# Patient Record
Sex: FEMALE | Race: ASIAN | NOT HISPANIC OR LATINO | Employment: FULL TIME | URBAN - METROPOLITAN AREA
[De-identification: names, ages, dates, MRNs, and addresses within clinical notes are randomized per-mention and may not be internally consistent; named-entity substitution may affect disease eponyms.]

---

## 2017-09-24 ENCOUNTER — OFFICE VISIT (OUTPATIENT)
Dept: URGENT CARE | Facility: CLINIC | Age: 52
End: 2017-09-24
Payer: COMMERCIAL

## 2017-09-24 DIAGNOSIS — M25.512 PAIN IN LEFT SHOULDER: ICD-10-CM

## 2017-09-24 PROCEDURE — 99213 OFFICE O/P EST LOW 20 MIN: CPT

## 2018-11-28 ENCOUNTER — HOSPITAL ENCOUNTER (EMERGENCY)
Facility: HOSPITAL | Age: 53
Discharge: LEFT BEFORE DISCHARGE | End: 2018-11-28
Attending: EMERGENCY MEDICINE
Payer: COMMERCIAL

## 2018-11-28 ENCOUNTER — OFFICE VISIT (OUTPATIENT)
Dept: URGENT CARE | Facility: CLINIC | Age: 53
End: 2018-11-28
Payer: COMMERCIAL

## 2018-11-28 VITALS
WEIGHT: 154 LBS | BODY MASS INDEX: 25.66 KG/M2 | RESPIRATION RATE: 16 BRPM | HEIGHT: 65 IN | HEART RATE: 107 BPM | SYSTOLIC BLOOD PRESSURE: 200 MMHG | TEMPERATURE: 100.2 F | DIASTOLIC BLOOD PRESSURE: 98 MMHG

## 2018-11-28 VITALS
SYSTOLIC BLOOD PRESSURE: 223 MMHG | DIASTOLIC BLOOD PRESSURE: 125 MMHG | HEART RATE: 115 BPM | OXYGEN SATURATION: 98 % | RESPIRATION RATE: 18 BRPM | TEMPERATURE: 100.1 F

## 2018-11-28 DIAGNOSIS — J06.9 ACUTE UPPER RESPIRATORY INFECTION: ICD-10-CM

## 2018-11-28 DIAGNOSIS — I10 HYPERTENSION: Primary | ICD-10-CM

## 2018-11-28 DIAGNOSIS — R68.89 FLU-LIKE SYMPTOMS: ICD-10-CM

## 2018-11-28 DIAGNOSIS — R03.0 ELEVATED BLOOD PRESSURE READING: Primary | ICD-10-CM

## 2018-11-28 PROCEDURE — 99283 EMERGENCY DEPT VISIT LOW MDM: CPT

## 2018-11-28 PROCEDURE — 99213 OFFICE O/P EST LOW 20 MIN: CPT | Performed by: PHYSICIAN ASSISTANT

## 2018-11-28 PROCEDURE — S9088 SERVICES PROVIDED IN URGENT: HCPCS | Performed by: PHYSICIAN ASSISTANT

## 2018-11-28 RX ORDER — BENZONATATE 100 MG/1
100 CAPSULE ORAL ONCE
Status: DISCONTINUED | OUTPATIENT
Start: 2018-11-28 | End: 2018-11-28 | Stop reason: HOSPADM

## 2018-11-28 RX ORDER — GUAIFENESIN/DEXTROMETHORPHAN 100-10MG/5
5 SYRUP ORAL 3 TIMES DAILY PRN
COMMUNITY

## 2018-11-29 NOTE — DISCHARGE INSTRUCTIONS
?? ??? ??   ?? ??:   ?? ??? ??  ? ?? ????? ???  ?, ???, ? ? ??? ??? ? ? ????    ???? ??? ??? ??? ????   ????? ?? ??? ?? 3~5?? ?? ????  ?? ??? ?? ??? ??? ? ????   · ?? ?? ???    · ??? ? ??    · ??? ?? ?? ??    · ????, ??? ??, ?? ?    · ???     · ?? ? ??    · ??, ?? ?? ???  ?? ?? ?? ??? ?????    · ?? ?? ?? ?? ??? ????     ??? ?? ??, ????? ??????    · ??? 39°C(102ºF)?  ?????     · ??? ??? ????? ???? ?? ?? ??? ??? ????     · 3~5? ??? ??? ????? 14?? ??? ??? ???? ????     · ??? ??? ????     · ?? ?? ?? ?? ????     · ???, ?? ?? ???? ??? ????     · ?? ???, ?? ?? ?? ?? ??? ???? ????     · 24?? ?? ??? ?? ??? ?? ? ????     · ?? ??? ????     · ?? ??, ?? ??? ??? ??? ??? ??? ????   ??? ?? ??:  ???? ???? ????  ??? ????? ?? ???? ???? ??? ? ????  ???? ???? 7~14??? ?????  2~3?? ??? ??? ?? ????  ??? ??? ??? ???? ? ????   · ??????  ? ???? ???? ?? ??? ????? ?????  ?????? ??? ???? ??? ??? ??? ???? ??? ? ????  ? ? ?? ?????? ????? ???? ????     · ?? ???  ? ??? ?????  ??? ?? ??? ???? ?? ???? ????? ?? ????      · ?????? ?? NSAID? ??, ?? ? ?? ??????  NSAID? ?????? ? ?? ?? ?? ??? ??? ? ????  ?? ?? ???? ???? ??? ??, ?? NSAID? ??? ???? ????? ??????  ?? ?? ??? ?? ??? ?????      · ???????  ? ??? ??? ??????  ??? ?? ??? ?? ?????  ???? ?? ??? ?? ??????  ?? ??? ?????  ???? ??? ?? ??? ??? ???? ???????? ???? ??? ?????? ??? ?? ???? ??????  ???????? ??? ???? ???? ?? ??? ?? ??? ? ? ????  ??? ????? ???????? ? 4??(4,000????)? ???? ??? ????   ?? ??:   · ??? ??? ?? ?????   ???? ??? ?? ?????      · ??? ?? ??? ? ?? ??????   ??? ??? ??? ????? ???? ??? ? ????  ??? ??? ?? ?? ??? ? ? ????  ??? ???? ???? ?, ?? ?? ? ?? ????  ???? ??? ??? ???? ????  ???? ?? ??? ?? ? ????  ??? ??? ?? ??? ???? ??? ????? ??????      · ???? ??????   ??? ???? ???????  ??? ?? ???? ?????  1/4???? ??? ??? ? ? ?? ??? ???? ????  ??? ???? ????? ?? ?? ?? ????  ??? ?? ????? ??? ?? ????     · ??? ?? ??????? ?????   ????? ??????? ???? ??? ??? ??????  ??? ???? ??? ??? ? ??? ? ? ????      · ??? ?? ??? ?? ???? ??????   ??? ?? ??? ??? ??? ???      · ??? ?? ???? ???? ?????   ??? ?? ?? ? ? ??? ???? ? ????      · ??? ??? ????   ?? ? ???? ?? ??? ? ?? ?? ??? ??? ???? ? ????  ??, ???? ?? ??? ?? ??? ??? ? ????  ?? ???? ??? ??? ??? ??? ?? ????? ??? ??? ??????  ?? ?? ?? ?? ???? ???? ???? ????  ??? ??? ???? ?? ????? ??????   ??? ?? ???? ????? ????    · ??? ?? ???? ?? 2~3???? ?? ???? ??? ?????      · ???? ??? ?? ??? ??? ?? ????      · ???? ???? ?? ???? ????     · ?? ??, ?? ?? ? ?? ?????  ?? ??? ??? ?? ??? ?? ?????? ??? ??? ?? ?? ?????        ??? ?? ??? ?? ??? ??????   ??? ?? ??, ?? ?? ???? ????? ? ?? ???? ??????    © 2017 [address] Information is for End User's use only and may not be sold, redistributed or otherwise used for commercial purposes  All illustrations and images included in CareNotes® are the copyrighted property of A [employer] M [employer]  or [employer]  The above information is an  only  It is not intended as medical advice for individual conditions or treatments  Talk to your doctor, nurse or pharmacist before following any medical regimen to see if it is safe and effective for you        ?? ???   ?? ??:   ???  ? ?? ??(BP)???  ??? ??? ????? ??? ?? ???? ????  ?? BP? 120/80 ?????  ?????? 120/80? 139/89 ?????  ???? 140/90 ?????  ???? BP? ?? ??? ?? ??? ???? ?? ?? ??? ????  ? ?? ??? ??? ? ????  ?? ???? ??? ?? ???? ??? ??? ? ?? ??? ?????  ??? ???? ??, ?, ? ? ?? ?? ?? ??? ??? ? ????    ???? ???? ??? ?????    · ??     · ??? ??    · ?? ??     · ???? ?? ???     · ?? ??     · ??  ?? ? ??? ??? ???? 911??? ??????    · ??? ???? ??, ???, ??? ?? ??? ?? ???? ????      · ???? ????? ??? ??????     · ??? ???? ???? ?? ??? ????     · ?, ?, ?, ?? ?? ?? ?? ?? ???? ????   ?? ?? ?? ??? ?????    · ??? ??? ??? ?? ??? ?????     · ??? ??? ?? ????   ??? ?? ??, ????? ??????    · ???, ?????, ??? ?? ?? ??? ???      · ???? ?? ??? ???? ???? ??? ??? ???? ??? ???? ??? ????     · ?? ??, ?? ??? ??? ??? ??? ??? ?? ??   ?? ???? ??  ?? BP? ????? ??? ??? ??? ??? ? ????  ????? ??? ??? ?? ??? ??? ? ??? ??? ?? ?? ??? ? ????  ?? ??? ??? ?? ???? ??? ?? ? ????  ??, ?? ??? ??? ? ?? ????  ??? ?? ???? ??? ??????   ?? ??? ??:  ???? ?? ??? ???? ???? ?? ??? ?? ???? ??????   · ???? ??? ??????   ??? ???? ?? 5?? ??? ?? ????  ?? ???? ???? ?? ?????  ?? ??? ?? ???? ???  ?? ???? ???? ?? ??? ?????  ???? ??? ??? ?? 2?? ?? ???? ?????  ?? ?? ? ??? ?? ??? ??? ?? ?? 2?? ??? ??????  ?? ???? ??? ???? ?? ?? ??? ??? ????  ??? ?? ??? ??? ?? ???? ????? ??????            · ??? ?? ???(??) ???? ??????   ??? ??? ??? ?? ??? ??? ? ? ????  ?? ??? ????? ???? ?? ?? ??? ?????  ?? ?? ??? ?? ?? ?? ??? ?????  ??? ???? ?? ??? ??? ? ????  ???? ??? ??? ??? ??? ? ?? ?? ?? ???? ????  ???? ???? ??? 2,300 mg?? ????? ??? ? ????            · ???? ???? ?? ??? ?????   ??? ?? ???? ??? ?? ?? DASH(Dietary Approaches to Stop Hypertension) ? ? ??? ?? ??? ??? ??? ? ????  DASH ??? ???, ?? ?? ? ???? ?? ??? ????  ??, ?? ? ???? ???? ??? ????            · ??? ?? ??? ????? ??? ??????   ?? ???? ??? ?? ??? 30?? ??????  ??? ?? ?? ??? ??? ???  ???? ?? ??? ??? ?? ??????      · ????? ?????   ??? ??? ?? ? ??? ?????  ?? ???? ?? ?? ??? ?? ??? ?? ??? ??? ?????     · ??? ?????   ??? ?? ? ??? ??? ???? ???  ??? ?? ? ??? ??? ???? ???  ??? ? ?? ?? 12??, ?? 5??, ??? 1½?????     · ??? ??? ????   ?? ? ???? ?? ??? ? ?? ????? BP? ????? ? ??? ??? ? ????  ?? ???? ??? ??? ??? ??? ?? ????? ??? ??? ??????  ?? ?? ?? ?? ???? ???? ???? ????  ??? ??? ???? ?? ????? ??????   ??? ?? ??? ??????   ????? ??? ???? ?? ?? ??? ???? ???  ??? ?? ??, ?? ?? ???? ????? ? ?? ???? ??????    © 2017 [address]  Information is for End User's use only and may not be sold, redistributed or otherwise used for commercial purposes  All illustrations and images included in CareNotes® are the copyrighted property of [employer]  or [employer]  The above information is an  only   It is not intended as medical advice for individual conditions or treatments  Talk to your doctor, nurse or pharmacist before following any medical regimen to see if it is safe and effective for you

## 2018-11-29 NOTE — PROGRESS NOTES
[address]        NAME: Marquis Schaffer is a 48 y o  female  : 1965    MRN: [id number]  DATE: 2018  TIME: 8:05 PM    Assessment and Plan   Elevated blood pressure reading [R03 0]  1  Elevated blood pressure reading  Transfer to other facility   2  Flu-like symptoms       Given BP and elevated HR pt requires more detailed evaluation  Unknown how long BP has been elevated as she does not see a doctor  BP checked multiple times with lowest reading being 200/98  She is not taking any medications  Son is driving her to ER     Patient Instructions   Patient Instructions   Go directly to ER for further evaluation of your blood pressure          Proceed to  ER if symptoms worsen  Chief Complaint     Chief Complaint   Patient presents with    Cough     pt has productive cough, sore throat, chills, and fever x 1 week  pt also feels pressure in both ears  pt took dayquil and robitussin  02 97         History of Present Illness       49 y/o female presents with cc of cough for one week  She has not seen a doctor in 5-6 years  She does not know of any medical conditions that she has  She c/o body aches, fevers, cough with sob at times and intermittent back pain  She denies cp  Review of Systems   Review of Systems   Constitutional: Positive for fever  HENT: Positive for sore throat  Respiratory: Positive for cough and shortness of breath  Negative for wheezing  Cardiovascular: Negative for chest pain  Musculoskeletal: Negative  Skin: Negative  Neurological: Negative            Current Medications       Current Outpatient Prescriptions:     dextromethorphan-guaiFENesin (ROBITUSSIN DM)  mg/5 mL syrup, Take 5 mL by mouth 3 (three) times a day as needed for cough, Disp: , Rfl:     DM-Phenylephrine-Acetaminophen (VICKS DAYQUIL COLD & FLU PO), Take by mouth, Disp: , Rfl:     Current Allergies     Allergies as of 2018    (No Known Allergies)            The following portions of the patient's history were reviewed and updated as appropriate: allergies, current medications, past family history, past medical history, past social history, past surgical history and problem list      History reviewed  No pertinent past medical history  History reviewed  No pertinent surgical history  No family history on file  Medications have been verified  Objective   BP (!) 200/98   Pulse (!) 107   Temp 100 2 °F (37 9 °C)   Resp 16   Ht 5' 5" (1 651 m)   Wt 69 9 kg (154 lb)   BMI 25 63 kg/m²        Physical Exam     Physical Exam   Constitutional: She is oriented to person, place, and time  She appears well-developed  No distress  HENT:   Right Ear: Tympanic membrane, external ear and ear canal normal    Left Ear: Tympanic membrane, external ear and ear canal normal    Nose: Nose normal    Mouth/Throat: Uvula is midline, oropharynx is clear and moist and mucous membranes are normal    Cardiovascular: Regular rhythm  Tachycardia present  Pulmonary/Chest: Effort normal and breath sounds normal    Lymphadenopathy:     She has no cervical adenopathy  Neurological: She is alert and oriented to person, place, and time  Skin: Skin is warm and dry

## 2018-11-29 NOTE — ED PROVIDER NOTES
History  Chief Complaint   Patient presents with    Hypertension     went to [facility] for cough and headache  sent to ED for hypertension     This 80-year-old female complains of cough and intermittent fever for 1 week  She notes postnasal drip  Occasionally mucus is yellow tinged  She is blowing her nose with yellow rhinorrhea  She states her eyelids were stuck together with discharge the other day  Patient denies headache, hemoptysis, myalgias, nausea, vomiting, abdominal pain, diarrhea, constipation or rash  Patient denies recent foreign travel  Patient was seen at urgent care and instructed to come here due to a blood pressures approximately 200/93  Patient recently has been taking over-the-counter medications including Vicks DayQuil (contains phenylephrine) and Robitussin DM  Prior to Admission Medications   Prescriptions Last Dose Informant Patient Reported? Taking? DM-Phenylephrine-Acetaminophen (VICKS DAYQUIL COLD & FLU PO) 11/27/2018 at Unknown time  Yes Yes   Sig: Take by mouth   dextromethorphan-guaiFENesin (ROBITUSSIN DM)  mg/5 mL syrup   Yes Yes   Sig: Take 5 mL by mouth 3 (three) times a day as needed for cough      Facility-Administered Medications: None       History reviewed  No pertinent past medical history  History reviewed  No pertinent surgical history  History reviewed  No pertinent family history  I have reviewed and agree with the history as documented  Social History   Substance Use Topics    Smoking status: Never Smoker    Smokeless tobacco: Never Used    Alcohol use Not on file        Review of Systems   Constitutional: Negative  HENT: Positive for congestion, ear pain, postnasal drip and rhinorrhea  Negative for dental problem, drooling, ear discharge, facial swelling, hearing loss, sinus pain, sinus pressure, sore throat, tinnitus and trouble swallowing  Eyes: Negative  Respiratory: Positive for cough   Negative for shortness of breath, wheezing and stridor  Cardiovascular: Negative  Gastrointestinal: Negative  Endocrine: Negative  Genitourinary: Negative  Musculoskeletal: Negative  Skin: Negative  Allergic/Immunologic: Negative  Neurological: Negative  Hematological: Negative  Psychiatric/Behavioral: Negative  All other systems reviewed and are negative  Physical Exam  Physical Exam   Constitutional: She is oriented to person, place, and time  She appears well-developed and well-nourished  No distress  HENT:   Head: Normocephalic and atraumatic  Right Ear: External ear normal    Left Ear: External ear normal    Nose: Nose normal    Mouth/Throat: Oropharynx is clear and moist    Yellow postnasal drip noted   Eyes: Pupils are equal, round, and reactive to light  Conjunctivae and EOM are normal  Right eye exhibits no discharge  Left eye exhibits no discharge  Neck: Normal range of motion  Neck supple  No JVD present  No tracheal deviation present  Cardiovascular: Normal rate, regular rhythm and intact distal pulses  No murmur heard  Pulmonary/Chest: Effort normal and breath sounds normal  No stridor  Abdominal: Soft  Bowel sounds are normal  There is no tenderness  There is no guarding  Musculoskeletal: Normal range of motion  She exhibits no edema  Neurological: She is alert and oriented to person, place, and time  She has normal reflexes  No cranial nerve deficit  Coordination normal    Skin: Skin is warm and dry  Capillary refill takes less than 2 seconds  No rash noted  She is not diaphoretic  Psychiatric: She has a normal mood and affect  Nursing note and vitals reviewed        Vital Signs  ED Triage Vitals [11/28/18 2000]   Temperature Pulse Respirations Blood Pressure SpO2   100 1 °F (37 8 °C) (!) 115 18 (!) 223/125 98 %      Temp src Heart Rate Source Patient Position - Orthostatic VS BP Location FiO2 (%)   -- -- Lying Right arm --      Pain Score       --           Vitals: 11/28/18 2000   BP: (!) 223/125   Pulse: (!) 115   Patient Position - Orthostatic VS: Lying       Visual Acuity      ED Medications  Medications - No data to display    Diagnostic Studies  Results Reviewed     None                 No orders to display              Procedures  Procedures       Phone Contacts  ED Phone Contact    ED Course  ED Course as of Nov 30 2038 Wed Nov 28, 2018 2111 The patient requested prescription for Tessalon  Her cough is much improved  I wrote a prescription for [employer] , # 21                                MDM  Number of Diagnoses or Management Options  Acute upper respiratory infection: new and does not require workup  Hypertension: new and requires workup  Diagnosis management comments: Patient decided to leave AMA before test were done  I explained her risks of organ injury due to hypertension including stroke CHF MI acute kidney injury  Patient verbalizes understanding of these issues and decided to leave AMA  Repeat blood pressure was 195/91       Amount and/or Complexity of Data Reviewed  Clinical lab tests: ordered      CritCare Time    Disposition  Final diagnoses:   Hypertension   Acute upper respiratory infection     Time reflects when diagnosis was documented in both MDM as applicable and the Disposition within this note     Time User Action Codes Description Comment    11/28/2018  8:56 PM Víctor Astorga Add [I10] Hypertension     11/28/2018  8:56 PM Víctor Astorga Add [J06 9] Acute upper respiratory infection       ED Disposition     ED Disposition Condition Comment    AMA  Date: 11/28/2018  Patient: Sacha Blood  Admitted: 11/28/2018  7:57 PM  Attending Provider: Ely Wynne DO    Sacha Blood or her authorized caregiver has made the decision for the patient to leave the emergency department against the advice of the [facility] department staff   She or her authorized caregiver has been informed and understands the inherent risks, including death, stroke, kidney injury and heart injury  She or her authorized caregiver has decided to accept the responsibility for this dec duglas Sotelo and all necessary parties have been advised that she may return for further evaluation or treatment  Her condition at time of discharge was stable  Constance Sotelo had current vital signs as follows:  BP (!) 223/125 (BP Location: Right arm)    Pulse (!) 115   Temp 100 1 °F (37 8 °C)   Resp 18         Follow-up Information     Follow up With Specialties Details Why Contact Info Additional Information    Infolink  Call in 1 day To obtain local doctor [phone]  [facility] Go in 1 day For recheck [phone] [id number]  [address] [facility] [address], [address], [address]          Discharge Medication List as of 11/28/2018  9:00 PM      CONTINUE these medications which have NOT CHANGED    Details   dextromethorphan-guaiFENesin (ROBITUSSIN DM)  mg/5 mL syrup Take 5 mL by mouth 3 (three) times a day as needed for cough, Historical Med      DM-Phenylephrine-Acetaminophen (VICKS DAYQUIL COLD & FLU PO) Take by mouth, Historical Med           No discharge procedures on file      ED Provider  Electronically Signed by           Mimi Burkitt, DO  11/28/18 2106       Mimi Burkitt, DO  11/30/18 2039